# Patient Record
Sex: FEMALE | Race: WHITE
[De-identification: names, ages, dates, MRNs, and addresses within clinical notes are randomized per-mention and may not be internally consistent; named-entity substitution may affect disease eponyms.]

---

## 2018-04-30 ENCOUNTER — HOSPITAL ENCOUNTER (OUTPATIENT)
Dept: HOSPITAL 17 - HRAD | Age: 63
End: 2018-04-30
Attending: FAMILY MEDICINE
Payer: MEDICARE

## 2018-04-30 DIAGNOSIS — R05: ICD-10-CM

## 2018-04-30 DIAGNOSIS — M79.641: Primary | ICD-10-CM

## 2018-04-30 PROCEDURE — 71046 X-RAY EXAM CHEST 2 VIEWS: CPT

## 2018-04-30 PROCEDURE — 73130 X-RAY EXAM OF HAND: CPT

## 2018-04-30 NOTE — RADRPT
EXAM DATE/TIME:  04/30/2018 10:46 

 

[facility] COMPARISON:     

No previous studies available for comparison.

 

                     

INDICATIONS :     

Right hand pain. No prior trauma.

                     

 

MEDICAL HISTORY :     

Arthritis.       Asthma.   

 

SURGICAL HISTORY :     

None.   

 

ENCOUNTER:     

Initial                                        

 

ACUITY:     

3 days      

 

PAIN SCORE:     

5/10

 

LOCATION:     

Right  hand.

 

FINDINGS:     

Three view examination of the right hand demonstrates no soft tissue swelling, dislocation, or fractu
re.   The carpal bones appear intact.  The interphalangeal and metacarpophalangeal joints are intact.
  Bony mineralization is normal.

 

CONCLUSION:     

The osseous structures of the hand are intact.

 

 

 

 Ramo Guerrero MD on April 30, 2018 at 10:58           

Board Certified Radiologist.

 This report was verified electronically.

## 2018-04-30 NOTE — RADRPT
EXAM DATE/TIME:  04/30/2018 10:41 

 

[facility] COMPARISON:     

No previous studies available for comparison.

 

                     

INDICATIONS :     

Cough. History of asthma.

                     

 

MEDICAL HISTORY :            

Asthma.   

 

SURGICAL HISTORY :     

None.   

 

ENCOUNTER:     

Initial                                        

 

ACUITY:     

1 month      

 

PAIN SCORE:     

0/10

 

LOCATION:     

Bilateral chest 

 

FINDINGS:     

PA and lateral views of the chest demonstrate the lungs to be symmetrically aerated without evidence 
of mass, infiltrate or effusion.  The cardiomediastinal contours are unremarkable.  Osseous structure
s are intact.

 

CONCLUSION:     

No acute cardiopulmonary disease.

 

 

 

 Ramo Guerrero MD on April 30, 2018 at 10:58           

Board Certified Radiologist.

 This report was verified electronically.